# Patient Record
Sex: FEMALE | Race: WHITE | NOT HISPANIC OR LATINO | Employment: OTHER | URBAN - METROPOLITAN AREA
[De-identification: names, ages, dates, MRNs, and addresses within clinical notes are randomized per-mention and may not be internally consistent; named-entity substitution may affect disease eponyms.]

---

## 2017-05-31 ENCOUNTER — FOLLOW UP (OUTPATIENT)
Dept: URBAN - METROPOLITAN AREA CLINIC 33 | Facility: CLINIC | Age: 72
End: 2017-05-31

## 2017-05-31 VITALS
DIASTOLIC BLOOD PRESSURE: 91 MMHG | HEART RATE: 60 BPM | WEIGHT: 165 LBS | BODY MASS INDEX: 29.23 KG/M2 | SYSTOLIC BLOOD PRESSURE: 139 MMHG | HEIGHT: 63 IN

## 2017-05-31 DIAGNOSIS — H35.363: ICD-10-CM

## 2017-05-31 DIAGNOSIS — H04.123: ICD-10-CM

## 2017-05-31 DIAGNOSIS — H02.833: ICD-10-CM

## 2017-05-31 DIAGNOSIS — H43.391: ICD-10-CM

## 2017-05-31 DIAGNOSIS — H02.836: ICD-10-CM

## 2017-05-31 DIAGNOSIS — H53.2: ICD-10-CM

## 2017-05-31 DIAGNOSIS — H35.372: ICD-10-CM

## 2017-05-31 PROCEDURE — 1036F TOBACCO NON-USER: CPT

## 2017-05-31 PROCEDURE — G8427 DOCREV CUR MEDS BY ELIG CLIN: HCPCS

## 2017-05-31 PROCEDURE — 92250 FUNDUS PHOTOGRAPHY W/I&R: CPT

## 2017-05-31 PROCEDURE — 92014 COMPRE OPH EXAM EST PT 1/>: CPT

## 2017-05-31 PROCEDURE — G8417 CALC BMI ABV UP PARAM F/U: HCPCS

## 2017-05-31 ASSESSMENT — TONOMETRY
OS_IOP_MMHG: 09
OD_IOP_MMHG: 08

## 2017-05-31 ASSESSMENT — VISUAL ACUITY
OS_CC: 20/20
OD_CC: 20/20-1

## 2017-09-28 NOTE — PATIENT DISCUSSION
Plan CE IOL OS 1st then OD. Recommend Custom Suze OU. Pt understands need for glasses at near. Pt elects Basic Plus OU. PO w/Dr Ledesma.

## 2018-05-07 ENCOUNTER — FOLLOW UP (OUTPATIENT)
Dept: URBAN - METROPOLITAN AREA CLINIC 33 | Facility: CLINIC | Age: 73
End: 2018-05-07

## 2018-05-07 VITALS
WEIGHT: 165 LBS | DIASTOLIC BLOOD PRESSURE: 80 MMHG | BODY MASS INDEX: 29.23 KG/M2 | SYSTOLIC BLOOD PRESSURE: 122 MMHG | HEART RATE: 56 BPM | HEIGHT: 63 IN

## 2018-05-07 DIAGNOSIS — H35.372: ICD-10-CM

## 2018-05-07 DIAGNOSIS — H43.391: ICD-10-CM

## 2018-05-07 DIAGNOSIS — H35.363: ICD-10-CM

## 2018-05-07 PROCEDURE — 92250 FUNDUS PHOTOGRAPHY W/I&R: CPT

## 2018-05-07 PROCEDURE — 92014 COMPRE OPH EXAM EST PT 1/>: CPT

## 2018-05-07 ASSESSMENT — TONOMETRY
OS_IOP_MMHG: 08
OD_IOP_MMHG: 09

## 2018-05-07 ASSESSMENT — VISUAL ACUITY
OS_CC: 20/20-1
OD_CC: 20/15

## 2019-05-22 ENCOUNTER — FOLLOW UP (OUTPATIENT)
Dept: URBAN - METROPOLITAN AREA CLINIC 33 | Facility: CLINIC | Age: 74
End: 2019-05-22

## 2019-05-22 VITALS — BODY MASS INDEX: 28.35 KG/M2 | WEIGHT: 160 LBS | HEIGHT: 63 IN

## 2019-05-22 DIAGNOSIS — H43.391: ICD-10-CM

## 2019-05-22 DIAGNOSIS — H53.2: ICD-10-CM

## 2019-05-22 DIAGNOSIS — H35.372: ICD-10-CM

## 2019-05-22 DIAGNOSIS — H35.363: ICD-10-CM

## 2019-05-22 PROCEDURE — 92134 CPTRZ OPH DX IMG PST SGM RTA: CPT

## 2019-05-22 PROCEDURE — 92014 COMPRE OPH EXAM EST PT 1/>: CPT

## 2019-05-22 ASSESSMENT — VISUAL ACUITY
OS_CC: 20/15-2
OD_CC: 20/15-1

## 2019-05-22 ASSESSMENT — TONOMETRY
OD_IOP_MMHG: 14
OS_IOP_MMHG: 12

## 2019-06-10 NOTE — PROCEDURE NOTE: SURGICAL
<p>Prior to commencing surgery patient identification, surgical procedure, site, and side were confirmed by Dr. Panda Mckeon. Following topical proparacaine anesthesia, the patient was positioned at the YAG laser, a contact lens coupled to the cornea with methylcellulose and an axial posterior capsulotomy performed without complication using 3.1 Mj x 41. Excess methylcellulose was washed from the eye, one drop of Alphagan was instilled and the patient returned to the holding area having tolerated the procedure well and without complication. </p><p>MRN:[id number]</p>

## 2021-04-20 NOTE — PATIENT DISCUSSION
Discussed the importance of blood pressure control in the prevention of ocular complications.
FB was removed at the slit lamp.
Good postoperative appearance.
Monitor.
Patient is doing well following YAG capsulotomy. Signs and symptoms of retinal detachment including flashing and floaters were discussed. Patient was asked to schedule yearly preventative follow-up eye exams with us.
Recommend vascular workup to rule out embolic disease with carotid and cardiac workup.
Recommended artificial tears and warm compresses.
Resume ASA as directed.
Reviewed and discussed natural history.
Use drops as directed.
Will monitor.
99
